# Patient Record
Sex: FEMALE | Race: BLACK OR AFRICAN AMERICAN | Employment: FULL TIME | URBAN - METROPOLITAN AREA
[De-identification: names, ages, dates, MRNs, and addresses within clinical notes are randomized per-mention and may not be internally consistent; named-entity substitution may affect disease eponyms.]

---

## 2017-09-30 ENCOUNTER — OFFICE VISIT (OUTPATIENT)
Dept: INTERNAL MEDICINE CLINIC | Facility: CLINIC | Age: 47
End: 2017-09-30

## 2017-09-30 ENCOUNTER — LAB ENCOUNTER (OUTPATIENT)
Dept: LAB | Age: 47
End: 2017-09-30
Attending: STUDENT IN AN ORGANIZED HEALTH CARE EDUCATION/TRAINING PROGRAM
Payer: COMMERCIAL

## 2017-09-30 ENCOUNTER — TELEPHONE (OUTPATIENT)
Dept: INTERNAL MEDICINE CLINIC | Facility: CLINIC | Age: 47
End: 2017-09-30

## 2017-09-30 VITALS
OXYGEN SATURATION: 97 % | HEART RATE: 80 BPM | RESPIRATION RATE: 16 BRPM | HEIGHT: 66 IN | BODY MASS INDEX: 32.62 KG/M2 | TEMPERATURE: 98 F | WEIGHT: 203 LBS | SYSTOLIC BLOOD PRESSURE: 110 MMHG | DIASTOLIC BLOOD PRESSURE: 74 MMHG

## 2017-09-30 DIAGNOSIS — J20.9 ACUTE BRONCHITIS, UNSPECIFIED ORGANISM: Primary | ICD-10-CM

## 2017-09-30 DIAGNOSIS — D64.9 ANEMIA, UNSPECIFIED TYPE: ICD-10-CM

## 2017-09-30 DIAGNOSIS — Z13.0 SCREENING FOR IRON DEFICIENCY ANEMIA: ICD-10-CM

## 2017-09-30 DIAGNOSIS — Z28.21 REFUSED INFLUENZA VACCINE: ICD-10-CM

## 2017-09-30 DIAGNOSIS — Z13.89 SCREENING FOR GENITOURINARY CONDITION: ICD-10-CM

## 2017-09-30 DIAGNOSIS — Z13.220 SCREENING FOR LIPID DISORDERS: ICD-10-CM

## 2017-09-30 DIAGNOSIS — E55.9 VITAMIN D DEFICIENCY: ICD-10-CM

## 2017-09-30 DIAGNOSIS — Z13.29 SCREENING FOR THYROID DISORDER: ICD-10-CM

## 2017-09-30 DIAGNOSIS — Z13.1 SCREENING FOR DIABETES MELLITUS: ICD-10-CM

## 2017-09-30 DIAGNOSIS — Z13.228 SCREENING FOR METABOLIC DISORDER: ICD-10-CM

## 2017-09-30 PROCEDURE — 80050 GENERAL HEALTH PANEL: CPT | Performed by: STUDENT IN AN ORGANIZED HEALTH CARE EDUCATION/TRAINING PROGRAM

## 2017-09-30 PROCEDURE — 99203 OFFICE O/P NEW LOW 30 MIN: CPT | Performed by: STUDENT IN AN ORGANIZED HEALTH CARE EDUCATION/TRAINING PROGRAM

## 2017-09-30 PROCEDURE — 83540 ASSAY OF IRON: CPT | Performed by: STUDENT IN AN ORGANIZED HEALTH CARE EDUCATION/TRAINING PROGRAM

## 2017-09-30 PROCEDURE — 83550 IRON BINDING TEST: CPT | Performed by: STUDENT IN AN ORGANIZED HEALTH CARE EDUCATION/TRAINING PROGRAM

## 2017-09-30 PROCEDURE — 83036 HEMOGLOBIN GLYCOSYLATED A1C: CPT | Performed by: STUDENT IN AN ORGANIZED HEALTH CARE EDUCATION/TRAINING PROGRAM

## 2017-09-30 PROCEDURE — 82306 VITAMIN D 25 HYDROXY: CPT | Performed by: STUDENT IN AN ORGANIZED HEALTH CARE EDUCATION/TRAINING PROGRAM

## 2017-09-30 PROCEDURE — 82728 ASSAY OF FERRITIN: CPT | Performed by: STUDENT IN AN ORGANIZED HEALTH CARE EDUCATION/TRAINING PROGRAM

## 2017-09-30 PROCEDURE — 36415 COLL VENOUS BLD VENIPUNCTURE: CPT | Performed by: STUDENT IN AN ORGANIZED HEALTH CARE EDUCATION/TRAINING PROGRAM

## 2017-09-30 PROCEDURE — 80061 LIPID PANEL: CPT | Performed by: STUDENT IN AN ORGANIZED HEALTH CARE EDUCATION/TRAINING PROGRAM

## 2017-09-30 RX ORDER — BENZONATATE 200 MG/1
200 CAPSULE ORAL 3 TIMES DAILY PRN
Qty: 30 CAPSULE | Refills: 0 | Status: SHIPPED | OUTPATIENT
Start: 2017-09-30 | End: 2017-11-03 | Stop reason: ALTCHOICE

## 2017-09-30 NOTE — PROGRESS NOTES
HPI:    Patient ID: Cassi Oliveira is a 49year old female. Cough   This is a new problem. The current episode started in the past 7 days. The problem has been gradually worsening. The problem occurs hourly. The cough is non-productive.  Associated sympt Allergies:  Aspirin                    PHYSICAL EXAM:   Physical Exam   Constitutional: She is oriented to person, place, and time. She appears well-developed and well-nourished. HENT:   Head: Normocephalic and atraumatic.    Right Ear: External ear n Mometasone Furo-Formoterol Fum (DULERA) 100-5 MCG/ACT Inhalation Aerosol 1 Inhaler 0      Sig: Inhale 2 puffs into the lungs 2 (two) times daily.       benzonatate 200 MG Oral Cap 30 capsule 0      Sig: Take 1 capsule (200 mg total) by mouth 3 (three) times

## 2017-09-30 NOTE — TELEPHONE ENCOUNTER
Please order labs for physical on November 3 2017 order at [facility].

## 2017-10-02 ENCOUNTER — TELEPHONE (OUTPATIENT)
Dept: INTERNAL MEDICINE CLINIC | Facility: CLINIC | Age: 47
End: 2017-10-02

## 2017-10-02 DIAGNOSIS — D64.9 ANEMIA, UNSPECIFIED TYPE: Primary | ICD-10-CM

## 2017-10-02 NOTE — TELEPHONE ENCOUNTER
----- Message from Joselin Ventura MD sent at 10/2/2017  3:26 PM CDT -----  Please, add on Iron, TIBC and ferritin to the current lab.  Thank you

## 2017-10-23 ENCOUNTER — TELEPHONE (OUTPATIENT)
Dept: INTERNAL MEDICINE CLINIC | Facility: CLINIC | Age: 47
End: 2017-10-23

## 2017-10-23 DIAGNOSIS — D64.9 ANEMIA, UNSPECIFIED TYPE: Primary | ICD-10-CM

## 2017-10-23 DIAGNOSIS — E55.9 VITAMIN D DEFICIENCY: ICD-10-CM

## 2017-10-23 RX ORDER — FERROUS SULFATE 325(65) MG
325 TABLET ORAL
Qty: 30 TABLET | Refills: 2 | Status: SHIPPED | OUTPATIENT
Start: 2017-10-23 | End: 2019-11-11 | Stop reason: ALTCHOICE

## 2017-10-23 RX ORDER — ERGOCALCIFEROL (VITAMIN D2) 1250 MCG
50000 CAPSULE ORAL WEEKLY
Qty: 12 CAPSULE | Refills: 0 | Status: SHIPPED | OUTPATIENT
Start: 2017-10-23 | End: 2018-02-28 | Stop reason: ALTCHOICE

## 2017-10-23 NOTE — TELEPHONE ENCOUNTER
Notes Recorded by Nolvia Cruz RN on 10/16/2017 at 10:58 AM CDT  Per Dr. Casimir Libman Ferrous Sulfate 325mg once daily and replace Vitamin D 50,000 units 1 capsule weekly x 12 weeks then repeat level.         Patient aware of test results and she verbalized

## 2017-10-23 NOTE — TELEPHONE ENCOUNTER
----- Message from Dia Cunha MD sent at 10/6/2017  5:55 PM CDT -----  Called the patient to discuss bloodwork results. Left a VM to call the office back on Monday.

## 2017-10-26 ENCOUNTER — APPOINTMENT (OUTPATIENT)
Dept: LAB | Age: 47
End: 2017-10-26
Attending: STUDENT IN AN ORGANIZED HEALTH CARE EDUCATION/TRAINING PROGRAM
Payer: COMMERCIAL

## 2017-10-26 ENCOUNTER — OFFICE VISIT (OUTPATIENT)
Dept: INTERNAL MEDICINE CLINIC | Facility: CLINIC | Age: 47
End: 2017-10-26

## 2017-10-26 VITALS
TEMPERATURE: 97 F | HEIGHT: 66 IN | SYSTOLIC BLOOD PRESSURE: 126 MMHG | BODY MASS INDEX: 32.62 KG/M2 | WEIGHT: 203 LBS | HEART RATE: 86 BPM | RESPIRATION RATE: 16 BRPM | DIASTOLIC BLOOD PRESSURE: 76 MMHG

## 2017-10-26 DIAGNOSIS — R05.3 PERSISTENT COUGH FOR 3 WEEKS OR LONGER: ICD-10-CM

## 2017-10-26 DIAGNOSIS — J20.9 BRONCHITIS WITH BRONCHOSPASM: Primary | ICD-10-CM

## 2017-10-26 DIAGNOSIS — Z13.89 SCREENING FOR GENITOURINARY CONDITION: ICD-10-CM

## 2017-10-26 PROCEDURE — 81001 URINALYSIS AUTO W/SCOPE: CPT | Performed by: STUDENT IN AN ORGANIZED HEALTH CARE EDUCATION/TRAINING PROGRAM

## 2017-10-26 PROCEDURE — 99213 OFFICE O/P EST LOW 20 MIN: CPT | Performed by: STUDENT IN AN ORGANIZED HEALTH CARE EDUCATION/TRAINING PROGRAM

## 2017-10-26 RX ORDER — AZITHROMYCIN 250 MG/1
TABLET, FILM COATED ORAL
Qty: 6 TABLET | Refills: 0 | Status: SHIPPED | OUTPATIENT
Start: 2017-10-26 | End: 2018-01-26 | Stop reason: ALTCHOICE

## 2017-10-26 RX ORDER — PREDNISONE 20 MG/1
20 TABLET ORAL 2 TIMES DAILY
Qty: 14 TABLET | Refills: 0 | Status: SHIPPED | OUTPATIENT
Start: 2017-10-26 | End: 2017-11-02

## 2017-10-26 NOTE — PROGRESS NOTES
HPI:    Patient ID: Reinier Holliday is a 49year old female. Cough   This is a chronic problem. The current episode started 1 to 4 weeks ago. The problem has been waxing and waning. Associated symptoms include shortness of breath.  Pertinent negatives inc complete (12 weeks) Disp: 12 capsule Rfl: 0   Ferrous Sulfate 325 (65 Fe) MG Oral Tab Take 1 tablet (325 mg total) by mouth daily with breakfast. Disp: 30 tablet Rfl: 2   benzonatate 200 MG Oral Cap Take 1 capsule (200 mg total) by mouth 3 (three) times da patient indicates understanding of these issues and agrees to the plan. The patient is asked to return if symptoms persist or worsen. No orders of the defined types were placed in this encounter.       Meds This Visit:  Signed Prescriptions Disp Refil

## 2017-11-03 ENCOUNTER — OFFICE VISIT (OUTPATIENT)
Dept: INTERNAL MEDICINE CLINIC | Facility: CLINIC | Age: 47
End: 2017-11-03

## 2017-11-03 VITALS
SYSTOLIC BLOOD PRESSURE: 116 MMHG | HEIGHT: 66 IN | OXYGEN SATURATION: 98 % | RESPIRATION RATE: 16 BRPM | HEART RATE: 85 BPM | BODY MASS INDEX: 34.23 KG/M2 | TEMPERATURE: 98 F | WEIGHT: 213 LBS | DIASTOLIC BLOOD PRESSURE: 80 MMHG

## 2017-11-03 DIAGNOSIS — Z00.00 ENCOUNTER FOR ANNUAL PHYSICAL EXAM: Primary | ICD-10-CM

## 2017-11-03 DIAGNOSIS — Z29.8 NEED FOR MALARIA PROPHYLAXIS: ICD-10-CM

## 2017-11-03 DIAGNOSIS — D50.9 IRON DEFICIENCY ANEMIA, UNSPECIFIED IRON DEFICIENCY ANEMIA TYPE: ICD-10-CM

## 2017-11-03 DIAGNOSIS — E55.9 VITAMIN D DEFICIENCY: ICD-10-CM

## 2017-11-03 DIAGNOSIS — Z23 NEED FOR TDAP VACCINATION: ICD-10-CM

## 2017-11-03 DIAGNOSIS — R09.81 NASAL CONGESTION: ICD-10-CM

## 2017-11-03 PROCEDURE — 90471 IMMUNIZATION ADMIN: CPT | Performed by: STUDENT IN AN ORGANIZED HEALTH CARE EDUCATION/TRAINING PROGRAM

## 2017-11-03 PROCEDURE — 99396 PREV VISIT EST AGE 40-64: CPT | Performed by: STUDENT IN AN ORGANIZED HEALTH CARE EDUCATION/TRAINING PROGRAM

## 2017-11-03 PROCEDURE — 90715 TDAP VACCINE 7 YRS/> IM: CPT | Performed by: STUDENT IN AN ORGANIZED HEALTH CARE EDUCATION/TRAINING PROGRAM

## 2017-11-03 RX ORDER — FLUTICASONE PROPIONATE 50 MCG
1 SPRAY, SUSPENSION (ML) NASAL DAILY
Qty: 1 BOTTLE | Refills: 0 | Status: SHIPPED | OUTPATIENT
Start: 2017-11-03 | End: 2017-12-21

## 2017-11-03 RX ORDER — ATOVAQUONE AND PROGUANIL HYDROCHLORIDE 250; 100 MG/1; MG/1
1 TABLET, FILM COATED ORAL DAILY
Qty: 30 TABLET | Refills: 0 | Status: SHIPPED | OUTPATIENT
Start: 2017-11-03 | End: 2018-01-26 | Stop reason: ALTCHOICE

## 2017-11-03 NOTE — PROGRESS NOTES
[facility]    REASON FOR VISIT:    Awilda Best is a 49year old female who presents for an [address]. Current Complaints:  Reports compliance with the medications, denies any side effects.   C/o persistent cough, nasal congest Recommendations Vary with Risk Recommendation Internal Lab or Procedure External Lab or Procedure   Cholesterol Screening Recommended screening varies with age, risk and gender LDL CHOLESTROL (mg/dL)   Date Value   10/30/2013 114     LDL Cholesterol (mg/dL Smoking status: Never Smoker                                                              Smokeless tobacco: Never Used                      Alcohol use: No                   REVIEW OF SYSTEMS:   Constitutional: Negative for fever, chills and fatigue. Declined. Abdominal: Soft. Bowel sounds are normal. There is no tenderness. Musculoskeletal: Normal range of motion. She exhibits no edema. Lymphadenopathy: She has no cervical adenopathy or supraclavicular adenopathy.    Neurological: She is alert and Start 2 days before exposure. Take 1 tab daily during the trip and 1 week after coming back. Nasal congestion  -     Fluticasone Propionate 50 MCG/ACT Nasal Suspension; 1 spray by Each Nare route daily.     Need for Tdap vaccination  -     TETANUS, DIPHT

## 2017-11-04 PROBLEM — E55.9 VITAMIN D DEFICIENCY: Status: ACTIVE | Noted: 2017-11-04

## 2017-11-04 PROBLEM — D50.9 IRON DEFICIENCY ANEMIA: Status: ACTIVE | Noted: 2017-11-04

## 2017-12-21 DIAGNOSIS — R09.81 NASAL CONGESTION: ICD-10-CM

## 2017-12-21 RX ORDER — FLUTICASONE PROPIONATE 50 MCG
SPRAY, SUSPENSION (ML) NASAL
Qty: 16 G | Refills: 1 | Status: SHIPPED | OUTPATIENT
Start: 2017-12-21 | End: 2019-11-11 | Stop reason: ALTCHOICE

## 2018-01-24 ENCOUNTER — TELEPHONE (OUTPATIENT)
Dept: INTERNAL MEDICINE CLINIC | Facility: CLINIC | Age: 48
End: 2018-01-24

## 2018-01-24 NOTE — TELEPHONE ENCOUNTER
Spoke with patient and she c/o on and off throat pain for a few weeks. States it feels like a \"bubbly sensation and a popcorn \" stuck in her throat. Denies fevers, difficulty swallowing/breathing.      Offered appointment for tomorrow, but she refused a

## 2018-01-26 ENCOUNTER — OFFICE VISIT (OUTPATIENT)
Dept: INTERNAL MEDICINE CLINIC | Facility: CLINIC | Age: 48
End: 2018-01-26

## 2018-01-26 VITALS
WEIGHT: 212 LBS | HEART RATE: 86 BPM | HEIGHT: 66 IN | DIASTOLIC BLOOD PRESSURE: 80 MMHG | BODY MASS INDEX: 34.07 KG/M2 | RESPIRATION RATE: 16 BRPM | TEMPERATURE: 98 F | SYSTOLIC BLOOD PRESSURE: 124 MMHG | OXYGEN SATURATION: 98 %

## 2018-01-26 DIAGNOSIS — Z87.11 HISTORY OF STOMACH ULCERS: ICD-10-CM

## 2018-01-26 DIAGNOSIS — K21.9 GASTROESOPHAGEAL REFLUX DISEASE, ESOPHAGITIS PRESENCE NOT SPECIFIED: Primary | ICD-10-CM

## 2018-01-26 DIAGNOSIS — E01.0 THYROMEGALY: ICD-10-CM

## 2018-01-26 DIAGNOSIS — Z12.39 BREAST CANCER SCREENING: ICD-10-CM

## 2018-01-26 PROCEDURE — 99214 OFFICE O/P EST MOD 30 MIN: CPT | Performed by: STUDENT IN AN ORGANIZED HEALTH CARE EDUCATION/TRAINING PROGRAM

## 2018-01-26 RX ORDER — PANTOPRAZOLE SODIUM 40 MG/1
40 TABLET, DELAYED RELEASE ORAL
Qty: 30 TABLET | Refills: 1 | Status: SHIPPED | OUTPATIENT
Start: 2018-01-26 | End: 2018-03-15

## 2018-01-26 NOTE — PROGRESS NOTES
HPI:    Patient ID: Juanito Chau is a 51year old female. Here with throat discomfort and epigastric pain for over a week. Noted burning in her chest and throat while in bed after big dinner meal.     Epigastric Pain   This is a new problem.  The current Current Outpatient Prescriptions:  Pantoprazole Sodium 40 MG Oral Tab EC Take 1 tablet (40 mg total) by mouth every morning before breakfast. Disp: 30 tablet Rfl: 1   FLUTICASONE PROPIONATE 50 MCG/ACT Nasal Suspension USE 1 SPRAY IN EACH NOSTRIL EVERY Gastroesophageal reflux disease, esophagitis presence not specified  (primary encounter diagnosis)  Patient presentation is likely due to GERD.   Start Protonix 40 mg 30 min before breakfast.  Due to reported h/o PUD will refer to GI for the evaluation an

## 2018-01-26 NOTE — PATIENT INSTRUCTIONS
Medicines for Acid Reflux  Your healthcare provider has told you that you have acid reflux. This condition causes stomach acid to wash up into your throat. For most people, acid reflux is troubling but not dangerous.  But left untreated, acid reflux somet · Limit drinking alcohol, caffeine, and fizzy beverages. All increase acid reflux. · Try limiting chocolate, peppermint, and spearmint. These can worsen acid reflux in some people. Watch when you eat  · Avoid lying down for 3 hours after eating.   · Do no

## 2018-02-17 ENCOUNTER — TELEPHONE (OUTPATIENT)
Dept: INTERNAL MEDICINE CLINIC | Facility: CLINIC | Age: 48
End: 2018-02-17

## 2018-02-20 ENCOUNTER — HOSPITAL ENCOUNTER (OUTPATIENT)
Dept: ULTRASOUND IMAGING | Age: 48
Discharge: HOME | End: 2018-02-20
Attending: STUDENT IN AN ORGANIZED HEALTH CARE EDUCATION/TRAINING PROGRAM
Payer: COMMERCIAL

## 2018-02-20 DIAGNOSIS — E01.0 THYROMEGALY: ICD-10-CM

## 2018-02-20 PROCEDURE — 76536 US EXAM OF HEAD AND NECK: CPT | Performed by: STUDENT IN AN ORGANIZED HEALTH CARE EDUCATION/TRAINING PROGRAM

## 2018-02-28 ENCOUNTER — OFFICE VISIT (OUTPATIENT)
Dept: INTERNAL MEDICINE CLINIC | Facility: CLINIC | Age: 48
End: 2018-02-28

## 2018-02-28 VITALS
DIASTOLIC BLOOD PRESSURE: 82 MMHG | BODY MASS INDEX: 33.59 KG/M2 | TEMPERATURE: 98 F | SYSTOLIC BLOOD PRESSURE: 114 MMHG | HEIGHT: 66 IN | OXYGEN SATURATION: 98 % | RESPIRATION RATE: 16 BRPM | HEART RATE: 76 BPM | WEIGHT: 209 LBS

## 2018-02-28 DIAGNOSIS — D50.0 IRON DEFICIENCY ANEMIA DUE TO CHRONIC BLOOD LOSS: ICD-10-CM

## 2018-02-28 DIAGNOSIS — K21.9 GASTROESOPHAGEAL REFLUX DISEASE, ESOPHAGITIS PRESENCE NOT SPECIFIED: Primary | ICD-10-CM

## 2018-02-28 DIAGNOSIS — E55.9 VITAMIN D DEFICIENCY: ICD-10-CM

## 2018-02-28 PROBLEM — N93.8 DUB (DYSFUNCTIONAL UTERINE BLEEDING): Status: ACTIVE | Noted: 2018-02-28

## 2018-02-28 PROCEDURE — 99214 OFFICE O/P EST MOD 30 MIN: CPT | Performed by: STUDENT IN AN ORGANIZED HEALTH CARE EDUCATION/TRAINING PROGRAM

## 2018-02-28 NOTE — PROGRESS NOTES
[facility]    REASON FOR VISIT:    Current Complaints: Patient presents with:  Gastro-esophageal Reflux      HPI:  Elina Crews is a 51year old female who presents for 1 month f/u for GERD symptoms.   Reports symptoms are on and off, does not t visual disturbance. Respiratory: Negative for cough and shortness of breath. Cardiovascular: Negative for chest pain and palpitations. Gastrointestinal: Negative for nausea, vomiting and diarrhea.  Positive for intermittent epigastric pain  Genitouri regularly. Advised to the patient to proceed with EGD as per GI recommendations. Importance of diagnostic value of EGD and biopsy was discussed in great details as well as medication compliance was emphasized to the patient.   She verbalized understanding

## 2018-06-07 ENCOUNTER — TELEPHONE (OUTPATIENT)
Dept: INTERNAL MEDICINE CLINIC | Facility: CLINIC | Age: 48
End: 2018-06-07

## 2018-06-07 NOTE — PATIENT INSTRUCTIONS
Insomnia  Insomnia is repeated difficulty going to sleep or staying asleep, or both. Whether you have insomnia is not defined by a specific amount of sleep.  Different people need different amounts of sleep, and you may need more or less sleep at [place] [id number] Many different medidcines can affect your sleep, such as stimulants, caffeine, alcohol, some decongestants, and diet pills.  Other medicines may include some types of blood pressure pills, steroids, asthma medicines, antihistamines, antidepressants, seizure · Get regular exercise. Find other ways to lessen your stress level. · If a medicine was prescribed to help reset your sleep patterns, take it as directed. Sleeping pills are intended for short-term use, only.  If taken for too long, the effect wears off w

## 2018-06-07 NOTE — TELEPHONE ENCOUNTER
Patient c/o sudden dizziness as she was getting up from chair at work, c/o headache, sweating, some nausea x2 hours. Patient alos c/o she feels like her heart is racing but has not taken her pulse.    Patient left work and is currently calling from the [facility]

## 2018-06-07 NOTE — PROGRESS NOTES
[facility]    REASON FOR VISIT:    Current Complaints: Patient presents with:  Headache: Since early afternoon  Dizziness: Since early afternoon  Fatigue: Since early afternoon      HPI:  Nika Umana is a 51year old female who presents with c Respiratory: Negative for cough and shortness of breath. Cardiovascular: Negative for chest pain and palpitations. Gastrointestinal: Negative for nausea, vomiting, abdominal pain and diarrhea.    Genitourinary: Negative for urgency, frequency and dif regarding work and rest balance, stress reduction and relaxation techniques. Importance of restful sleep was addressed as well. Will give a week off work for patient to recover. Psychophysiological insomnia   Most likely related to her anxiety.   Trial

## 2018-06-11 NOTE — PATIENT INSTRUCTIONS
Counseling for Depression  For some people, counseling, also called talk therapy, has been found to be as effective as medicine for mild to moderate depression.  When done by a trained professional, this treatment is a powerful way to better understand yo · A local support group or community group  · A 12-step program (such as Alcoholics Anonymous) for dealing with problems that can contribute to depression, such as alcohol or drug addiction  Date Last Reviewed: 1/1/2017  © 4157-4793 [employer]

## 2018-06-12 NOTE — PROGRESS NOTES
[facility]    REASON FOR VISIT:    Current Complaints: Patient presents with: Anxiety: Follow Up      HPI:  Awilda Best is a 51year old female who presents for a follow-up visit.   Patient was seen last week on 6/7/2018 for anxiety reaction a Eyes: Negative for pain and visual disturbance. Respiratory: Negative for cough and shortness of breath. Cardiovascular: Negative for chest pain and palpitations. Gastrointestinal: Negative for nausea, vomiting, abdominal pain and diarrhea.    Kayleigh depression and anxiety and treatment options. Patient does not wish to start any medications at this point such as SSRI antidepressant Lexapro. She opted for behavior counseling. Will place a referral for counseling to [facility] navigator.   Patient was

## 2018-10-31 DIAGNOSIS — D64.9 ANEMIA, UNSPECIFIED TYPE: ICD-10-CM

## 2018-10-31 RX ORDER — FERROUS SULFATE 325(65) MG
325 TABLET ORAL
Qty: 30 TABLET | Refills: 2 | OUTPATIENT
Start: 2018-10-31

## 2018-11-19 ENCOUNTER — TELEPHONE (OUTPATIENT)
Dept: INTERNAL MEDICINE CLINIC | Facility: CLINIC | Age: 48
End: 2018-11-19

## 2018-11-19 DIAGNOSIS — Z29.8 NEED FOR MALARIA PROPHYLAXIS: ICD-10-CM

## 2018-11-19 NOTE — TELEPHONE ENCOUNTER
Patient called in and is wondering if Dr. Luli Ontiveros can prescribe the same medication for malaria that she was given last year. Patient is going to be traveling to [place] December 25th - January 8th.  Patient stated she will need to take this medicatio

## 2018-11-19 NOTE — TELEPHONE ENCOUNTER
Pt departs for [facility] December 25th and returns January 8th, requesting malaria medication that was prescribed last year. Pended but need clarification on instructions. Is it daily 2 days before departure through 7 days after return? (24 days).   Pt s

## 2018-11-26 RX ORDER — ATOVAQUONE AND PROGUANIL HYDROCHLORIDE 250; 100 MG/1; MG/1
TABLET, FILM COATED ORAL
Qty: 24 TABLET | Refills: 0 | Status: SHIPPED | OUTPATIENT
Start: 2018-11-26 | End: 2019-11-11 | Stop reason: ALTCHOICE

## 2018-11-26 NOTE — TELEPHONE ENCOUNTER
Spoke to patient, patient aware of how to take medication. Prescription correct. Patient expressed understanding.

## 2019-11-11 ENCOUNTER — OFFICE VISIT (OUTPATIENT)
Dept: INTERNAL MEDICINE CLINIC | Facility: CLINIC | Age: 49
End: 2019-11-11
Payer: COMMERCIAL

## 2019-11-11 VITALS
RESPIRATION RATE: 14 BRPM | DIASTOLIC BLOOD PRESSURE: 72 MMHG | SYSTOLIC BLOOD PRESSURE: 120 MMHG | HEIGHT: 69 IN | OXYGEN SATURATION: 96 % | BODY MASS INDEX: 29.47 KG/M2 | WEIGHT: 199 LBS | HEART RATE: 80 BPM | TEMPERATURE: 98 F

## 2019-11-11 DIAGNOSIS — Z13.0 SCREENING FOR IRON DEFICIENCY ANEMIA: ICD-10-CM

## 2019-11-11 DIAGNOSIS — Z13.228 SCREENING FOR METABOLIC DISORDER: ICD-10-CM

## 2019-11-11 DIAGNOSIS — Z13.1 SCREENING FOR DIABETES MELLITUS: ICD-10-CM

## 2019-11-11 DIAGNOSIS — E55.9 VITAMIN D DEFICIENCY: ICD-10-CM

## 2019-11-11 DIAGNOSIS — G89.29 CHRONIC PAIN OF BOTH KNEES: Primary | ICD-10-CM

## 2019-11-11 DIAGNOSIS — M25.562 CHRONIC PAIN OF BOTH KNEES: Primary | ICD-10-CM

## 2019-11-11 DIAGNOSIS — M25.561 CHRONIC PAIN OF BOTH KNEES: Primary | ICD-10-CM

## 2019-11-11 PROCEDURE — 99214 OFFICE O/P EST MOD 30 MIN: CPT | Performed by: STUDENT IN AN ORGANIZED HEALTH CARE EDUCATION/TRAINING PROGRAM

## 2019-11-12 NOTE — PROGRESS NOTES
HPI:    Patient ID: Natalie Adams is a 47year old female. HPI  Reports multiple joint pain, particularly both knees. Has been having knee pian for about 4 months. Sometimes has morning stiffness. Denies knee swelling or redness. No trauma.  Very high h Neck: Normal range of motion. Neck supple. Cardiovascular: Normal rate, regular rhythm, normal heart sounds and intact distal pulses. Pulmonary/Chest: Effort normal and breath sounds normal.   Abdominal: Soft.  Bowel sounds are normal. Musculoskeletal

## 2019-11-18 ENCOUNTER — HOSPITAL ENCOUNTER (OUTPATIENT)
Dept: GENERAL RADIOLOGY | Age: 49
Discharge: HOME | End: 2019-11-18
Attending: STUDENT IN AN ORGANIZED HEALTH CARE EDUCATION/TRAINING PROGRAM
Payer: COMMERCIAL

## 2019-11-18 DIAGNOSIS — M25.561 CHRONIC PAIN OF BOTH KNEES: ICD-10-CM

## 2019-11-18 DIAGNOSIS — M25.562 CHRONIC PAIN OF BOTH KNEES: ICD-10-CM

## 2019-11-18 DIAGNOSIS — G89.29 CHRONIC PAIN OF BOTH KNEES: ICD-10-CM

## 2019-11-18 PROCEDURE — 73562 X-RAY EXAM OF KNEE 3: CPT | Performed by: STUDENT IN AN ORGANIZED HEALTH CARE EDUCATION/TRAINING PROGRAM

## 2019-11-22 ENCOUNTER — OFFICE VISIT (OUTPATIENT)
Dept: INTERNAL MEDICINE CLINIC | Facility: CLINIC | Age: 49
End: 2019-11-22
Payer: COMMERCIAL

## 2019-11-22 VITALS
TEMPERATURE: 98 F | SYSTOLIC BLOOD PRESSURE: 128 MMHG | WEIGHT: 200.19 LBS | DIASTOLIC BLOOD PRESSURE: 82 MMHG | BODY MASS INDEX: 29.65 KG/M2 | OXYGEN SATURATION: 96 % | HEIGHT: 69 IN | HEART RATE: 76 BPM

## 2019-11-22 DIAGNOSIS — Z12.31 BREAST CANCER SCREENING BY MAMMOGRAM: ICD-10-CM

## 2019-11-22 DIAGNOSIS — Z29.8 NEED FOR MALARIA PROPHYLAXIS: ICD-10-CM

## 2019-11-22 DIAGNOSIS — Z00.00 ENCOUNTER FOR ANNUAL PHYSICAL EXAM: Primary | ICD-10-CM

## 2019-11-22 DIAGNOSIS — Z12.11 COLON CANCER SCREENING: ICD-10-CM

## 2019-11-22 PROCEDURE — 99396 PREV VISIT EST AGE 40-64: CPT | Performed by: STUDENT IN AN ORGANIZED HEALTH CARE EDUCATION/TRAINING PROGRAM

## 2019-11-22 RX ORDER — ATOVAQUONE AND PROGUANIL HYDROCHLORIDE 250; 100 MG/1; MG/1
TABLET, FILM COATED ORAL
Qty: 28 TABLET | Refills: 0 | Status: SHIPPED | OUTPATIENT
Start: 2019-11-22 | End: 2021-09-11

## 2019-11-22 NOTE — PATIENT INSTRUCTIONS
Prevention Guidelines, Men Ages 48 to 59  Screening tests and vaccines are an important part of managing your health. A screening test is done to find possible disorders or diseases in people who don't have any symptoms.  The goal is to find a disease ear High cholesterol or triglycerides All men in this age group At least every 5 years   HIV Men at increased risk for infection – talk with your healthcare provider At routine exams   Lung cancer Adults age 54 to [de-identified] who have smoked Yearly screening in smokers Pneumococcal conjugate vaccine (PCV13) and pneumococcal polysaccharide vaccine (PPSV23) Men at increased risk for infection – talk with your healthcare provider PCV13: 1 dose ages 23 to 72 (protects against 13 types of pneumococcal bacteria)     PPSV23: 1

## 2019-11-22 NOTE — PROGRESS NOTES
[facility]    REASON FOR VISIT:    Jasvir Luong is a 47year old female who presents for an [address]. Current Complaints: reports feeling well, knees are better - has been feeling relief with Diclofenac gel.  Reports complianc Diabetes Screening  if history of high blood pressure or other  risk factors HgbA1C (%)   Date Value   09/30/2017 5.8 (H)     Glucose (mg/dL)   Date Value   09/30/2017 99     GLUCOSE (mg/dL)   Date Value   10/30/2013 85         Gonorrhea Screening if hi Negative for cough and shortness of breath. Cardiovascular: Negative for chest pain and palpitations. Gastrointestinal: Negative for nausea, vomiting, abdominal pain and diarrhea.    Genitourinary: Negative for urgency, frequency and difficulty urinati GYN.  :  Declined. Sees GYN. Abdominal: Soft. Bowel sounds are normal. There is no tenderness. Musculoskeletal: Normal range of motion. She exhibits no edema.    Extremities: no cyanosis, clubbing or edema, no varicosities or stasis change, 2+DP/PT pul Future    Need for malaria prophylaxis   Pt is going to [place] in December. Malarone given. Use as directed. -     Atovaquone-Proguanil HCl 250-100 MG Oral Tab; Start 2 days before exposure. Take 1 tab daily during the trip and 1 week after coming back.

## 2019-12-04 ENCOUNTER — OFFICE VISIT (OUTPATIENT)
Dept: INTERNAL MEDICINE CLINIC | Facility: CLINIC | Age: 49
End: 2019-12-04
Payer: COMMERCIAL

## 2019-12-04 VITALS
OXYGEN SATURATION: 96 % | SYSTOLIC BLOOD PRESSURE: 120 MMHG | HEIGHT: 69 IN | RESPIRATION RATE: 14 BRPM | WEIGHT: 200 LBS | DIASTOLIC BLOOD PRESSURE: 76 MMHG | BODY MASS INDEX: 29.62 KG/M2 | HEART RATE: 90 BPM

## 2019-12-04 DIAGNOSIS — N30.00 ACUTE CYSTITIS WITHOUT HEMATURIA: Primary | ICD-10-CM

## 2019-12-04 PROCEDURE — 99213 OFFICE O/P EST LOW 20 MIN: CPT | Performed by: NURSE PRACTITIONER

## 2019-12-04 PROCEDURE — 87086 URINE CULTURE/COLONY COUNT: CPT | Performed by: NURSE PRACTITIONER

## 2019-12-04 PROCEDURE — 81003 URINALYSIS AUTO W/O SCOPE: CPT | Performed by: NURSE PRACTITIONER

## 2019-12-04 RX ORDER — NITROFURANTOIN 25; 75 MG/1; MG/1
100 CAPSULE ORAL 2 TIMES DAILY
Qty: 14 CAPSULE | Refills: 0 | Status: SHIPPED | OUTPATIENT
Start: 2019-12-04 | End: 2019-12-11

## 2019-12-04 NOTE — PATIENT INSTRUCTIONS
Take antibiotic completely as ordered     Take antibiotic with food    Eat yogurt twice a day while on antibiotic     Monitor for diarrhea    F/U in 3-5 days if your symptoms dont improve    Make an appointment to get your colonoscopy done    Get your labs the bowels. The bacteria get onto the skin around the opening of the urethra. From there, they can get into the urine and travel up to the bladder, causing inflammation and infection. This usually happens because of:  · Wiping improperly after urinating.  A loose-fitting clothes and cotton underwear. Avoid tight-fitting pants. · Improve your diet and prevent constipation. Eat more fresh fruit and vegetables, and fiber, and less junk and fatty foods. · Avoid sex until your symptoms are gone.   · Avoid caffein

## 2019-12-04 NOTE — PROGRESS NOTES
Corinne Albert is a 47year old female. CHIEF COMPLAINT   Urinary frequency, urgency, bladder pressure     HPI:   Patient has had urinary frequency, pressure, and urgency for 3 weeks. She also gets intermittent pain from 2-8/10 to her suprapubic area.  She Normal gait. EXTREMITIES: no cyanosis, clubbing or edema  NEURO: Oriented times three,cranial nerves are grossly intact,      LABS:      Lab Results   Component Value Date    WBC 4.5 09/30/2017    RBC 4.70 09/30/2017    HGB 11.4 (L) 09/30/2017    HCT 37. 1

## 2021-09-11 ENCOUNTER — OFFICE VISIT (OUTPATIENT)
Dept: INTERNAL MEDICINE CLINIC | Facility: CLINIC | Age: 51
End: 2021-09-11
Payer: COMMERCIAL

## 2021-09-11 VITALS
BODY MASS INDEX: 31.7 KG/M2 | SYSTOLIC BLOOD PRESSURE: 116 MMHG | HEIGHT: 69 IN | RESPIRATION RATE: 14 BRPM | WEIGHT: 214 LBS | HEART RATE: 71 BPM | OXYGEN SATURATION: 97 % | DIASTOLIC BLOOD PRESSURE: 82 MMHG | TEMPERATURE: 99 F

## 2021-09-11 DIAGNOSIS — R39.9 UTI SYMPTOMS: Primary | ICD-10-CM

## 2021-09-11 DIAGNOSIS — L29.9 ITCHING OF EAR: ICD-10-CM

## 2021-09-11 DIAGNOSIS — Z29.8 NEED FOR MALARIA PROPHYLAXIS: ICD-10-CM

## 2021-09-11 DIAGNOSIS — N76.0 ACUTE VAGINITIS: ICD-10-CM

## 2021-09-11 LAB
BILIRUB UR QL STRIP.AUTO: NEGATIVE
COLOR UR AUTO: YELLOW
GLUCOSE UR STRIP.AUTO-MCNC: NEGATIVE MG/DL
KETONES UR STRIP.AUTO-MCNC: NEGATIVE MG/DL
NITRITE UR QL STRIP.AUTO: NEGATIVE
PH UR STRIP.AUTO: 5 [PH] (ref 5–8)
PROT UR STRIP.AUTO-MCNC: NEGATIVE MG/DL
SP GR UR STRIP.AUTO: 1.02 (ref 1–1.03)
UROBILINOGEN UR STRIP.AUTO-MCNC: <2 MG/DL

## 2021-09-11 PROCEDURE — 87086 URINE CULTURE/COLONY COUNT: CPT | Performed by: NURSE PRACTITIONER

## 2021-09-11 PROCEDURE — 99214 OFFICE O/P EST MOD 30 MIN: CPT | Performed by: NURSE PRACTITIONER

## 2021-09-11 PROCEDURE — 3074F SYST BP LT 130 MM HG: CPT | Performed by: NURSE PRACTITIONER

## 2021-09-11 PROCEDURE — 81001 URINALYSIS AUTO W/SCOPE: CPT | Performed by: NURSE PRACTITIONER

## 2021-09-11 PROCEDURE — 3008F BODY MASS INDEX DOCD: CPT | Performed by: NURSE PRACTITIONER

## 2021-09-11 PROCEDURE — 3079F DIAST BP 80-89 MM HG: CPT | Performed by: NURSE PRACTITIONER

## 2021-09-11 PROCEDURE — 99000 SPECIMEN HANDLING OFFICE-LAB: CPT | Performed by: NURSE PRACTITIONER

## 2021-09-11 RX ORDER — CLOTRIMAZOLE AND BETAMETHASONE DIPROPIONATE 10; .64 MG/G; MG/G
1 CREAM TOPICAL 2 TIMES DAILY
Qty: 60 G | Refills: 0 | Status: SHIPPED | OUTPATIENT
Start: 2021-09-11 | End: 2021-09-18

## 2021-09-11 RX ORDER — ATOVAQUONE AND PROGUANIL HYDROCHLORIDE 250; 100 MG/1; MG/1
TABLET, FILM COATED ORAL
Qty: 28 TABLET | Refills: 0 | Status: SHIPPED | OUTPATIENT
Start: 2021-09-11 | End: 2021-11-23

## 2021-09-11 RX ORDER — HYDROCORTISONE AND ACETIC ACID 1.1; 2.41 G/100ML; G/100ML
3 SOLUTION AURICULAR (OTIC) 3 TIMES DAILY
Qty: 1 EACH | Refills: 0 | Status: SHIPPED | OUTPATIENT
Start: 2021-09-11 | End: 2021-09-18

## 2021-09-11 NOTE — PROGRESS NOTES
Enrike Tran is a 45year old female. CHIEF COMPLAINT   Multiple issues    HPI:   Patient reports multiple problems. Left ear itching for a while. No pain now. Maybe allergy related. She takes benadryl at times and it helps.     Urine symptoms- urine three      LABS:      Lab Results   Component Value Date    WBC 4.5 09/30/2017    RBC 4.70 09/30/2017    HGB 11.4 (L) 09/30/2017    HCT 37.1 09/30/2017    MCV 78.9 (L) 09/30/2017    MCH 24.3 (L) 09/30/2017    MCHC 30.7 (L) 09/30/2017    RDW 17.1 (H) 09/30/ Take 1 tab daily during the trip and 1 week after coming back. Dispense: 28 tablet; Refill: 0      The patient indicates understanding of these issues and agrees to the plan.   The patient is asked to return as needed

## 2021-09-13 NOTE — PROGRESS NOTES
Spoke to pt, aware of results & recommendations. Pt voiced understanding. Pt denies any more urinary frequency or urgency, but still c/o itchiness. Noted pt was also seen for acute vaginitis. Pt had not picked up cream yet. Pt to  today.  Aware to

## 2021-11-23 ENCOUNTER — TELEPHONE (OUTPATIENT)
Dept: INTERNAL MEDICINE CLINIC | Facility: CLINIC | Age: 51
End: 2021-11-23

## 2021-11-23 DIAGNOSIS — Z29.8 NEED FOR MALARIA PROPHYLAXIS: ICD-10-CM

## 2021-11-23 RX ORDER — ATOVAQUONE AND PROGUANIL HYDROCHLORIDE 250; 100 MG/1; MG/1
TABLET, FILM COATED ORAL
Qty: 28 TABLET | Refills: 0 | Status: SHIPPED | OUTPATIENT
Start: 2021-11-23

## 2021-11-23 NOTE — TELEPHONE ENCOUNTER
Patient is traveling to [place]\"" on December 16th. She rescheduled this trip from September. [facility] Later had prescribed medication for malaria at that time but patient never picked it up and the pharmacy no longer has it.   Would Denise write another prescription

## 2023-05-02 ENCOUNTER — TELEPHONE (OUTPATIENT)
Dept: INTERNAL MEDICINE CLINIC | Facility: CLINIC | Age: 53
End: 2023-05-02

## 2023-05-02 NOTE — TELEPHONE ENCOUNTER
Pt  whispering throughout call, states she went to  an ER yesterday for abdominal pain, and was prescribed medications for \"inflammation in stomach\". She did not provide name of medications or location of ER. No recent encounter seen in Care Everywhere. Pt  states she was told to follow up with pcp and she asks for pain medication and sedatives to be prescribed over the phone today. Last OV 9/11/2021. Pt denies fever, nausea, vomiting, diarrhea, blood in stool, states she is feeling better today. Pt accepts first available OV appointment for Thursday May 4, advised to seek emergency care if symptoms worsen, fever, increased abdominal pain, nausea/vomiting, blood in stool.    Future Appointments   Date Time Provider [place]   5/4/2023  9:40 AM Beatriz Mercedes, ESTELLE [facility] [address] [facility] [address]

## (undated) NOTE — LETTER
Date: 6/7/2018    Patient Name: Harry Joaquin          To Whom it may concern: The above patient was seen at the [facility] for treatment of a medical condition.     This patient should be excused from attending work from 06/07/18   miko

## (undated) NOTE — LETTER
12/11/19        [place] [address]      Dear Jose Marie,    [facility] records indicate that you have outstanding lab work and or testing that was ordered for you and has not yet been completed:        CBC W Differential W Plate

## (undated) NOTE — LETTER
12/23/19        [place] [id number]      Dear Anel Johnson,    [facility] records indicate that you have outstanding lab work and or testing that was ordered for you and has not yet been completed:        ADEN ALLRED 2D+3D SCREENING B